# Patient Record
Sex: FEMALE | Race: OTHER | HISPANIC OR LATINO | URBAN - METROPOLITAN AREA
[De-identification: names, ages, dates, MRNs, and addresses within clinical notes are randomized per-mention and may not be internally consistent; named-entity substitution may affect disease eponyms.]

---

## 2019-10-19 ENCOUNTER — EMERGENCY (EMERGENCY)
Facility: HOSPITAL | Age: 34
LOS: 1 days | Discharge: HOME | End: 2019-10-19
Attending: EMERGENCY MEDICINE
Payer: SELF-PAY

## 2019-10-19 VITALS
RESPIRATION RATE: 18 BRPM | TEMPERATURE: 98 F | DIASTOLIC BLOOD PRESSURE: 75 MMHG | HEIGHT: 57 IN | SYSTOLIC BLOOD PRESSURE: 113 MMHG | WEIGHT: 110.01 LBS | OXYGEN SATURATION: 100 % | HEART RATE: 74 BPM

## 2019-10-19 PROCEDURE — 99053 MED SERV 10PM-8AM 24 HR FAC: CPT

## 2019-10-19 PROCEDURE — 12001 RPR S/N/AX/GEN/TRNK 2.5CM/<: CPT

## 2019-10-19 PROCEDURE — 90715 TDAP VACCINE 7 YRS/> IM: CPT

## 2019-10-19 PROCEDURE — 99282 EMERGENCY DEPT VISIT SF MDM: CPT | Mod: 25

## 2019-10-19 PROCEDURE — 99283 EMERGENCY DEPT VISIT LOW MDM: CPT | Mod: 25

## 2019-10-19 PROCEDURE — 90471 IMMUNIZATION ADMIN: CPT

## 2019-10-19 RX ORDER — TETANUS TOXOID, REDUCED DIPHTHERIA TOXOID AND ACELLULAR PERTUSSIS VACCINE, ADSORBED 5; 2.5; 8; 8; 2.5 [IU]/.5ML; [IU]/.5ML; UG/.5ML; UG/.5ML; UG/.5ML
0.5 SUSPENSION INTRAMUSCULAR ONCE
Refills: 0 | Status: COMPLETED | OUTPATIENT
Start: 2019-10-19 | End: 2019-10-19

## 2019-10-19 RX ADMIN — TETANUS TOXOID, REDUCED DIPHTHERIA TOXOID AND ACELLULAR PERTUSSIS VACCINE, ADSORBED 0.5 MILLILITER(S): 5; 2.5; 8; 8; 2.5 SUSPENSION INTRAMUSCULAR at 23:47

## 2019-10-19 NOTE — ED PROVIDER NOTE - OBJECTIVE STATEMENT
Pt is a 33 y/o F, no PMH, presents to ED c/o laceration to L hand. Pt states she was using a knife at work 2 hours PTA when the blade slipped and she cut her R hand. Pt states she the wound bled at the time of the incident but has been controlled with direct pressure. Pt has no other complaints at this time. Pt is unsure of her last tetanus vaccine. Pt denies paresthesias, loss of function of hand, dizziness, CP.    Pt refused ED  services and requested sister in law (Barbara ) interpret

## 2019-10-19 NOTE — ED PROVIDER NOTE - PATIENT PORTAL LINK FT
You can access the FollowMyHealth Patient Portal offered by [facility] by registering at the following website: [email or web address]. By joining [employer]’s FollowMyHealth portal, you will also be able to view your health information using other applications (apps) compatible with our system.

## 2019-10-19 NOTE — ED PROVIDER NOTE - ATTENDING CONTRIBUTION TO CARE
I, Cal Odom, performed a face to face bedside interview with this patient regarding history of present illness, review of symptoms and relevant past medical, social and family history.  I completed an independent physical examination. I have communicated the patient’s plan of care and disposition with the ACP.      33 yo with laceration to left hand with knife;  denies any other trauma; pain with movement;  pe left hadn 1cm laceration over 1st mcp    sensation intact  pain with rom;    dx hand laceration;  lac repair;  educaiton on hand lacerations; infecitons, tendon and nerve involvement

## 2019-10-19 NOTE — ED PROVIDER NOTE - PHYSICAL EXAMINATION
Skin: 1cm laceration to dorsal L hand inferior to base of L thumb.     Vasc: Cap refill < 2 secs, + 2 radial pulses    MSK: Full ROM L hand and digits 1-5. + flexion / extension of L thumb. 5/5 str.

## 2019-10-19 NOTE — ED PROVIDER NOTE - CLINICAL SUMMARY MEDICAL DECISION MAKING FREE TEXT BOX
Laceration of L hand at base of L thumb. Lac repair, tetanus updated. Pt advised to return to ED in 7-10 days for suture removal and given wound care instructions

## 2019-10-20 NOTE — ED PROCEDURE NOTE - PROCEDURE NAME, MLM
"    Patient interested in weight loss surgery    Nery Rocio Bustos      Height: 5' 4\"    Weight: 230    BMI: 38.8      Instructed to check with insurance company regarding exclusions prior to first appt.    Scheduled to see CNP or CARMEN at 7:45 am, followed by an appt with a dietitian at 9 am.    Left message: Yes, regarding appointment for Wednesday 05/01/2019.    Instructed to view seminar and complete pre-visit questionnaire prior to visit at [email or web address].    [phone] contact center phone number      Pinky Hallman      " Laceration Repair

## 2019-10-29 ENCOUNTER — EMERGENCY (EMERGENCY)
Facility: HOSPITAL | Age: 34
LOS: 1 days | Discharge: HOME | End: 2019-10-29
Attending: EMERGENCY MEDICINE
Payer: SELF-PAY

## 2019-10-29 VITALS
SYSTOLIC BLOOD PRESSURE: 110 MMHG | RESPIRATION RATE: 20 BRPM | DIASTOLIC BLOOD PRESSURE: 69 MMHG | WEIGHT: 117.95 LBS | HEIGHT: 61 IN | OXYGEN SATURATION: 100 % | HEART RATE: 70 BPM | TEMPERATURE: 98 F

## 2019-10-29 PROCEDURE — G0463: CPT

## 2019-10-29 PROCEDURE — T1013: CPT

## 2019-10-29 NOTE — ED PROVIDER NOTE - PHYSICAL EXAMINATION
Vital signs noted, see flowsheet.  General: Well nourished/developed. In no acute distress, well appearing and non-toxic.  HEENT: Moist mucous membranes.   Cardiac: Regular rate and rhythm. +S1/S2. Peripheral pulses 2+ and symmetric b/l.  Respiratory: Speaking in full sentences, no evidence of respiratory distress. Lungs clear to ascultation b/l  Skin: 1cm well healed laceration to left thumb with 2 sutures in place, no wound dehiscence, no surrounding erythema/red streaking, no discharge, no increased warmth. Normal color for race, no evidence of rash, ecchymosis, cyanosis or jaundice.   LEFT UE: Non tender to palpation, no swelling, sensation intact, + FROM, peripheral pulses 2+ b/l. Capillary refill less than 2 seconds.   Neuro: Awake, alert and oriented to person/place/time/situation. Moves all extremities spontaneously and symmetrically.

## 2019-10-29 NOTE — ED PROVIDER NOTE - CLINICAL SUMMARY MEDICAL DECISION MAKING FREE TEXT BOX
33 y/o F for suture removal from left thumb, sutures placed 10 days ago, well healed no signs of infection. Will remove sutures, wound care. Discussed wound care and f/u PMD. Slight wound dehiscence after suture removal, steristrips placed.

## 2019-10-29 NOTE — ED PROCEDURE NOTE - CPROC ED INFORMED CONSENT1
ED  Alma./Benefits, risks, and possible complications of procedure explained to patient/caregiver who verbalized understanding and gave verbal consent.

## 2019-10-29 NOTE — ED PROVIDER NOTE - PATIENT PORTAL LINK FT
You can access the FollowMyHealth Patient Portal offered by [facility] by registering at the following website: [email or web address]. By joining [employer]’s FollowMyHealth portal, you will also be able to view your health information using other applications (apps) compatible with our system.

## 2019-10-29 NOTE — ED PROVIDER NOTE - CHPI ED SYMPTOMS NEG
no pain/no purulent drainage/no red streaks/no drainage/no bleeding/no redness/no bleeding at site/no fever/no inflammation/no chills

## 2019-10-29 NOTE — ED PROVIDER NOTE - ATTENDING CONTRIBUTION TO CARE
33yo F presenting for encounter for suture removal, sutures placed on 10/19 on left thumb, wound C/D/I, well-approximated. Sutures removed by EDWARD Farfan stable for MS home. Traci Henao DO

## 2019-10-29 NOTE — ED PROVIDER NOTE - OBJECTIVE STATEMENT
33 y/o F presents to ED for suture removal of sutures placed on 10/19/19 of the left thumb at [facility] ED. Pt has been cleaning wound as instructed at home. Pt has no other acute complaints at this time.  Denies fever/chills, increased pain to site, swelling, erythema, red streaking, warmth at site, discharge or pus from wound, decreased ROM, numbness/tingling, weakness, CP, SOB.  Pt had TDAP updated in ED.  PMD: Dr. Bishop